# Patient Record
Sex: MALE | Race: OTHER | NOT HISPANIC OR LATINO | URBAN - METROPOLITAN AREA
[De-identification: names, ages, dates, MRNs, and addresses within clinical notes are randomized per-mention and may not be internally consistent; named-entity substitution may affect disease eponyms.]

---

## 2017-09-24 ENCOUNTER — EMERGENCY (EMERGENCY)
Facility: HOSPITAL | Age: 32
LOS: 1 days | Discharge: HOME | End: 2017-09-24
Attending: EMERGENCY MEDICINE | Admitting: EMERGENCY MEDICINE
Payer: SELF-PAY

## 2017-09-24 VITALS
DIASTOLIC BLOOD PRESSURE: 68 MMHG | HEART RATE: 60 BPM | SYSTOLIC BLOOD PRESSURE: 111 MMHG | RESPIRATION RATE: 16 BRPM | OXYGEN SATURATION: 100 % | TEMPERATURE: 98 F

## 2017-09-24 PROCEDURE — 99284 EMERGENCY DEPT VISIT MOD MDM: CPT

## 2017-09-24 RX ORDER — OXYCODONE HYDROCHLORIDE 5 MG/1
1 TABLET ORAL
Qty: 12 | Refills: 0 | OUTPATIENT
Start: 2017-09-24

## 2017-09-24 RX ORDER — PENICILLIN V POTASSIUM 250 MG
1 TABLET ORAL
Qty: 40 | Refills: 0 | OUTPATIENT
Start: 2017-09-24 | End: 2017-10-04

## 2017-09-24 RX ORDER — PENICILLIN V POTASSIUM 250 MG
500 TABLET ORAL ONCE
Qty: 0 | Refills: 0 | Status: COMPLETED | OUTPATIENT
Start: 2017-09-24 | End: 2017-09-24

## 2017-09-24 RX ORDER — IBUPROFEN 200 MG
600 TABLET ORAL ONCE
Qty: 0 | Refills: 0 | Status: COMPLETED | OUTPATIENT
Start: 2017-09-24 | End: 2017-09-24

## 2017-09-24 RX ADMIN — Medication 500 MILLIGRAM(S): at 19:39

## 2017-09-24 RX ADMIN — Medication 600 MILLIGRAM(S): at 19:39

## 2017-09-24 NOTE — ED PROVIDER NOTE - PROGRESS NOTE DETAILS
[place]: Pt waiting for registration to send rx to pharmacy. Discussed with patient need to return to ED if symptoms don't continue to improve or recur or develops any new or worsening symptoms that are of concern. Reviewed discharge instructions for discharge. All pts questions answered.  Patient verbalizes understanding.

## 2017-09-24 NOTE — ED PROVIDER NOTE - PLAN OF CARE
Please use 600mg motrin (or advil or ibuprofen) every 6 hours as needed for pain/discomfort/swelling.   Follow up with the [facility] dental clinic as soon as possible.  Call [phone] for an appointment.  Call [facility] Find a doctor at [phone] to find an provider who takes your insurance   Fill the prescription for percocet, 1 pill every 6 hours for severe pain.  No not mix this medication with Tylenol as this medication already contains Tylenol.  Make sure to stay hydrated when taking this medication.  Please also use the stool softener Colace (also called docusate sodium) 100mg 3 times a day to avoid constipation which is common when taking percocet.  This can be purchased over the counter.  Watch for drowsiness while taking this medication. No driving or operating heavy machinery.   Please return to the ER for severe pain, fevers, severe bleeding, increased swelling or any other concerns.   Eat a soft diet and chew on the opposite side.  Brush your teeth several times a day.

## 2017-09-24 NOTE — ED PROVIDER NOTE - CARE PLAN
Principal Discharge DX:	Dental caries  Instructions for follow-up, activity and diet:	Please use 600mg motrin (or advil or ibuprofen) every 6 hours as needed for pain/discomfort/swelling.   Follow up with the [facility] dental clinic as soon as possible.  Call [phone] for an appointment.  Call [facility] Find a doctor at [phone] to find an provider who takes your insurance   Fill the prescription for percocet, 1 pill every 6 hours for severe pain.  No not mix this medication with Tylenol as this medication already contains Tylenol.  Make sure to stay hydrated when taking this medication.  Please also use the stool softener Colace (also called docusate sodium) 100mg 3 times a day to avoid constipation which is common when taking percocet.  This can be purchased over the counter.  Watch for drowsiness while taking this medication. No driving or operating heavy machinery.   Please return to the ER for severe pain, fevers, severe bleeding, increased swelling or any other concerns.   Eat a soft diet and chew on the opposite side.  Brush your teeth several times a day.

## 2017-09-24 NOTE — ED PROVIDER NOTE - OBJECTIVE STATEMENT
31 y/o male, with no significant PMHx, presents to the ED c/o toothache x 11 months with worsening x 2.5 week. Pt reports lower left wisdom tooth being impacted and causing pain. Dentist from 6 years ago was hesitant about removing the wisdom tooth due to closeness to the nerve. Left lower tooth sensitive to cold temperatures. Takes Tylenol for pain. Last dentist visit 5 years ago. Denies fever, chills, N/V, and any other complaints. NKDA.

## 2017-09-24 NOTE — ED PROVIDER NOTE - MEDICAL DECISION MAKING DETAILS
Impacted wisdom tooth with possible periapical infection. Will start on abx, pain control, and have pt f/u with dental tomorrow.

## 2017-10-01 NOTE — ED PROVIDER NOTE - DISCHARGE REVIEW MATERIAL PRESENTED
Patient is lying quietly on the stretcher in no apparent distress. Pt is alert and oriented x 4. Respirations are even and unlabored. No needs are expressed at this time. .

## 2018-09-06 PROBLEM — Z00.00 ENCOUNTER FOR PREVENTIVE HEALTH EXAMINATION: Status: ACTIVE | Noted: 2018-09-06

## 2018-09-19 ENCOUNTER — APPOINTMENT (OUTPATIENT)
Dept: ORTHOPEDIC SURGERY | Facility: HOSPITAL | Age: 33
End: 2018-09-19

## 2018-11-07 ENCOUNTER — EMERGENCY (EMERGENCY)
Facility: HOSPITAL | Age: 33
LOS: 1 days | Discharge: HOME | End: 2018-11-07
Attending: EMERGENCY MEDICINE | Admitting: EMERGENCY MEDICINE
Payer: MEDICAID

## 2018-11-07 VITALS
TEMPERATURE: 98 F | RESPIRATION RATE: 18 BRPM | SYSTOLIC BLOOD PRESSURE: 116 MMHG | HEART RATE: 69 BPM | DIASTOLIC BLOOD PRESSURE: 72 MMHG

## 2018-11-07 PROCEDURE — 99282 EMERGENCY DEPT VISIT SF MDM: CPT

## 2018-11-07 RX ORDER — IBUPROFEN 200 MG
600 TABLET ORAL ONCE
Qty: 0 | Refills: 0 | Status: COMPLETED | OUTPATIENT
Start: 2018-11-07 | End: 2018-11-07

## 2018-11-07 RX ORDER — OXYCODONE AND ACETAMINOPHEN 5; 325 MG/1; MG/1
1 TABLET ORAL ONCE
Qty: 0 | Refills: 0 | Status: DISCONTINUED | OUTPATIENT
Start: 2018-11-07 | End: 2018-11-07

## 2018-11-07 RX ADMIN — OXYCODONE AND ACETAMINOPHEN 1 TABLET(S): 5; 325 TABLET ORAL at 15:56

## 2018-11-07 RX ADMIN — Medication 600 MILLIGRAM(S): at 15:55

## 2018-11-07 NOTE — ED PROVIDER NOTE - PROGRESS NOTE DETAILS
TRAVIS Murcia- paged dental resident, [facility] pt can be seen in dental clinic now. rn to give pain meds and pt to walk to clinic in [facility]. pt amenable with plan TRAVIS Murcia- call from dental, states will see pt in ed instead. pt aware

## 2018-11-07 NOTE — ED PROVIDER NOTE - CARE PLAN
Principal Discharge DX:	Tooth pain  Assessment and plan of treatment:	Report to Dental Clinic now, located at [facility]. Phone number: [phone]. Take Ibuprofen 600mg every 8 hours as needed for pain, take with food. Continue taking oxycodone at home as needed for severe pain- CAUTION do not drive nor drink alcohol as may cause drowsiness. Return to Er for any new symptoms, fever, chills, pus discharge, swelling or any other concerns.

## 2018-11-07 NOTE — ED ADULT TRIAGE NOTE - NS ED NURSE BANDS TYPE
Detail Level: Detailed Name band; Consent: The risks of atrophy were reviewed with the patient. Lot # For Kenalog (Optional): [id number] Include Z78.9 (Other Specified Conditions Influencing Health Status) As An Associated Diagnosis?: No Total Volume (Ccs): 0.4 Kenalog Preparation: Kenalog Expiration Date For Kenalog (Optional): [id number] Administered By (Optional): Rainer Tipton X Size Of Lesion In Cm (Optional): 0 Medical Necessity Clause: This procedure was medically necessary because the lesions that were treated were: Concentration Of Kenalog Solution Injected (Mg/Ml): 10.0

## 2018-11-07 NOTE — ED PROVIDER NOTE - PHYSICAL EXAMINATION
posterior molar of left mandible cracked in half with exposed pulp, ttp , no drainage, swelling, abscess, no signs of infection, no lymphadenopathy

## 2018-11-07 NOTE — ED PROVIDER NOTE - OBJECTIVE STATEMENT
34 YO M presents to the ED c/o broken tooth x few days ago. Pt states it chipped away while eating. Nerve is exposed. Pt notes there is pain, numbness and slurred speaking. Motrin and Oxycodin with minimal relief, last dose this morning. Pt states he vomited secondary to pain. Oxycodin received for previously broken hand. NKDA. 34 YO M presents to the ED c/o broken tooth x few days ago. Pt states it chipped away while eating. Nerve is exposed. Pt notes there is pain, numbness and slurred speaking. Motrin and Oxycodone with minimal relief, last dose this morning. Pt states he vomited secondary to pain. Oxycodone received for previously broken hand. NKDA. Pt does not have a dentist,; just got insurance. No further complaints. 32 YO M presents to the ED c/o broken tooth x few days ago. Pt states it chipped away while eating. Nerve is exposed. No fevers, dc from tooth or gum swelling. Pt notes there is pain, numbness in area of affected tooth. Motrin and Oxycodone with minimal relief, last dose this morning. Pt states he vomited secondary to pain. Oxycodone received for previously broken hand. NKDA. Pt does not have a dentist,; just got insurance. No further complaints.

## 2018-11-07 NOTE — PROGRESS NOTE ADULT - SUBJECTIVE AND OBJECTIVE BOX
Patient is a 33y old  Male who presents with a chief complaint of dental pain x 1 day    HPI: patient reports tooth in question has been cracked for 10 years and yesterday the tooth cracked some more.  Today the tooth has begun to hurt to an extreme amount (8/10).    PAST MEDICAL & SURGICAL HISTORY:  No pertinent past medical history  No significant past surgical history    MEDICATIONS  (STANDING):  MEDICATIONS  (PRN):    Allrgies  No Known Allergies    SOCIAL HISTORY: presents with wife and son    Last Dental Visit: 10 years ago    Vital Signs Last 24 Hrs  T(C): 36.6 (07 Nov 2018 15:04), Max: 36.6 (07 Nov 2018 15:04)  T(F): 97.8 (07 Nov 2018 15:04), Max: 97.8 (07 Nov 2018 15:04)  HR: 69 (07 Nov 2018 15:04) (69 - 69)  BP: 116/72 (07 Nov 2018 15:04) (116/72 - 116/72)  BP(mean): --  RR: 18 (07 Nov 2018 15:04) (18 - 18)  SpO2: --    EOE:  TMJ (  - ) clicks                    (   - ) pops                    (  -  ) crepitus             Mandible FROM             Facial bones and MOM grossly intact             ( -  ) trismus             ( -  ) LAD             ( -  ) swelling             (  - ) asymmetry             (  + ) palpation - sensitivity on palpation of angle of mandible             (  - ) SOB             (  - ) dysphagia             (  - ) LOC    IOE:  permanent dentition: multiple carious teeth           hard/soft palate:  (  - ) palatal torus           tongue/FOM WNL           labial/buccal mucosa WNL           ( +  ) percussion #18           ( +  ) palpation #18 buccal alveolar bone, vestibular area           (  - ) swelling     Dentition present: grossly carious #18 with pain on percussion    DENTAL RADIOGRAPHS: 2 pa radiograph taken and interpreted: #18 with large carious lesion and large periapical radiolucency    ASSESSMENT: Carious tooth #18 with apical periodontitis, acute dental pain, and hypereruption of #18    PROCEDURE:  Explained poor prognosis of tooth to patient and need for extraction on outpatient basis. Verbal consent given. 1 carpule 0.5% bupivacaine administered buccal infiltration #18.  Occlusal equilibrium performed tooth #18.  Patient reports relief of pain.    RECOMMENDATIONS:  1) OTC pain meds per ED, F/U with [facility] dental clinic for extraction #18.  2) Dental F/U with outpatient dentist for comprehensive dental care.   3) If any difficulty swallowing/breathing, fever occur, page dental.     Neil Joe DDS #[id number]  Shazia Mars DDS #[id number]

## 2018-11-07 NOTE — ED PROVIDER NOTE - PLAN OF CARE
Report to Dental Clinic now, located at [facility]. Phone number: [phone]. Take Ibuprofen 600mg every 8 hours as needed for pain, take with food. Continue taking oxycodone at home as needed for severe pain- CAUTION do not drive nor drink alcohol as may cause drowsiness. Return to Er for any new symptoms, fever, chills, pus discharge, swelling or any other concerns.